# Patient Record
Sex: FEMALE | Race: WHITE | NOT HISPANIC OR LATINO | Employment: FULL TIME | URBAN - METROPOLITAN AREA
[De-identification: names, ages, dates, MRNs, and addresses within clinical notes are randomized per-mention and may not be internally consistent; named-entity substitution may affect disease eponyms.]

---

## 2023-07-05 ENCOUNTER — TELEPHONE (OUTPATIENT)
Dept: PRIMARY CARE | Facility: CLINIC | Age: 63
End: 2023-07-05
Payer: COMMERCIAL

## 2023-07-05 DIAGNOSIS — D24.1 FIBROADENOMA OF BREAST, RIGHT: Primary | ICD-10-CM

## 2023-07-05 DIAGNOSIS — Z12.31 VISIT FOR SCREENING MAMMOGRAM: ICD-10-CM

## 2023-07-05 NOTE — TELEPHONE ENCOUNTER
Result Communication    Resulted Orders   US limited breast    Narrative    Interpreted By:  RONDA HOLMAN MD  MRN: [id number]  Patient Name: JUDITH BAKER     STUDY:  BREAST ULTRASOUND;  7/3/2023 2:08 pm     ACCESSION NUMBER(S):  [id number]     ORDERING CLINICIAN:  NIRALI ARNOLD     INDICATION:  Follow-up of right breast mass. History of right breast benign biopsy.     COMPARISON:  01/03/2023     FINDINGS:  Targeted ultrasound examination with elastography of the right breast  6:00 position 2 cm from nipple again demonstrates the previously  noted circumscribed oval parallel hypoechoic mass, measuring 1.0 x  1.0 x 0.5 cm, without significant change given difference in  technique. It demonstrates no internal vascularity. It is very soft  on the elastography. This again likely represents a fibroadenoma.       Impression    Right breast stable probably benign mass likely representing a  fibroadenoma at 6:00 position. Further ultrasound follow-up in 5-6  months is recommended. This may coincide with the patient's next  annual mammogram.     BI-RADS CATEGORY:     Category: 3 - Probably Benign.  Recommendation: 5-6 month Follow-up.     For any future breast imaging appointments, please call [phone]).          11:41 AM  ----- Message from Nirali Arnold MD sent at 7/5/2023  8:04 AM EDT -----  Right breast ultrasound is unchanged, consistent with a fibroadenoma which is benign, recommendation is repeat regular screening mammogram with ultrasound in 6 months          Results were successfully communicated with the patient and they acknowledged their understanding.

## 2023-07-05 NOTE — TELEPHONE ENCOUNTER
----- Message from Frida Correa MD sent at 7/5/2023  8:04 AM EDT -----  Right breast ultrasound is unchanged, consistent with a fibroadenoma which is benign, recommendation is repeat regular screening mammogram with ultrasound in 6 months

## 2023-07-07 ENCOUNTER — TELEPHONE (OUTPATIENT)
Dept: PRIMARY CARE | Facility: CLINIC | Age: 63
End: 2023-07-07
Payer: COMMERCIAL

## 2023-10-12 PROCEDURE — 87102 FUNGUS ISOLATION CULTURE: CPT

## 2023-10-12 PROCEDURE — 87206 SMEAR FLUORESCENT/ACID STAI: CPT

## 2023-10-12 PROCEDURE — 87102 FUNGUS ISOLATION CULTURE: CPT | Mod: OUT | Performed by: DERMATOLOGY

## 2023-10-20 ENCOUNTER — LAB REQUISITION (OUTPATIENT)
Dept: LAB | Facility: HOSPITAL | Age: 63
End: 2023-10-20
Payer: COMMERCIAL

## 2023-10-20 DIAGNOSIS — B35.3 TINEA PEDIS: ICD-10-CM

## 2023-11-20 LAB
CALCOFLUOR WHITE SPEC: NORMAL
FUNGUS SPEC CULT: NORMAL

## 2023-11-28 PROBLEM — K82.4 POLYP OF GALLBLADDER: Status: ACTIVE | Noted: 2023-11-28

## 2023-11-28 PROBLEM — N95.2 VAGINAL ATROPHY: Status: ACTIVE | Noted: 2023-11-28

## 2023-11-28 PROBLEM — E78.5 DYSLIPIDEMIA (HIGH LDL; LOW HDL): Status: ACTIVE | Noted: 2023-11-28

## 2023-11-28 PROBLEM — N83.209 BENIGN OVARIAN CYST: Status: ACTIVE | Noted: 2023-11-28

## 2023-11-28 PROBLEM — R30.0 DYSURIA: Status: RESOLVED | Noted: 2023-11-28 | Resolved: 2023-11-28

## 2023-11-28 PROBLEM — H81.10 BPPV (BENIGN PAROXYSMAL POSITIONAL VERTIGO): Status: ACTIVE | Noted: 2023-11-28

## 2023-11-28 PROBLEM — M65.332 TRIGGER MIDDLE FINGER OF LEFT HAND: Status: ACTIVE | Noted: 2023-11-28

## 2023-11-28 PROBLEM — R42 VERTIGO: Status: ACTIVE | Noted: 2023-11-28

## 2023-11-28 PROBLEM — E53.8 VITAMIN B12 DEFICIENCY: Status: ACTIVE | Noted: 2023-11-28

## 2023-11-28 PROBLEM — M79.10 MYALGIA: Status: ACTIVE | Noted: 2023-11-28

## 2023-11-28 PROBLEM — L25.9 DERMATITIS, CONTACT: Status: RESOLVED | Noted: 2023-11-28 | Resolved: 2023-11-28

## 2023-11-28 PROBLEM — M25.50 ARTHRALGIA OF MULTIPLE JOINTS: Status: RESOLVED | Noted: 2023-11-28 | Resolved: 2023-11-28

## 2023-11-28 PROBLEM — R10.13 ABDOMINAL PAIN, EPIGASTRIC: Status: RESOLVED | Noted: 2023-11-28 | Resolved: 2023-11-28

## 2023-11-28 PROBLEM — R92.8 ABNORMAL MAMMOGRAM: Status: ACTIVE | Noted: 2023-11-28

## 2023-11-28 PROBLEM — L82.1 SEBORRHEIC KERATOSES: Status: ACTIVE | Noted: 2023-11-28

## 2023-11-28 PROBLEM — M19.049 HAND ARTHRITIS: Status: ACTIVE | Noted: 2023-11-28

## 2023-11-28 PROBLEM — D13.5 ADENOMYOMATOSIS OF GALLBLADDER: Status: ACTIVE | Noted: 2023-11-28

## 2023-11-28 PROBLEM — F43.9 STRESS AT HOME: Status: ACTIVE | Noted: 2023-11-28

## 2023-11-28 PROBLEM — K80.20 CHOLELITHIASIS: Status: ACTIVE | Noted: 2023-11-28

## 2023-11-28 PROBLEM — M25.572 ANKLE PAIN, LEFT: Status: RESOLVED | Noted: 2023-11-28 | Resolved: 2023-11-28

## 2023-11-28 PROBLEM — F41.1 GENERALIZED ANXIETY DISORDER: Status: ACTIVE | Noted: 2023-11-28

## 2023-11-28 RX ORDER — IBUPROFEN 600 MG/1
TABLET ORAL
COMMUNITY
Start: 2023-01-05 | End: 2023-11-30 | Stop reason: ALTCHOICE

## 2023-11-28 RX ORDER — DOCOSANOL 100 MG/G
CREAM TOPICAL
COMMUNITY
Start: 2017-07-12 | End: 2023-11-30 | Stop reason: ALTCHOICE

## 2023-11-28 RX ORDER — NAPROXEN 500 MG/1
TABLET ORAL
COMMUNITY
Start: 2023-01-20 | End: 2023-11-30 | Stop reason: ALTCHOICE

## 2023-11-28 RX ORDER — OXYCODONE HYDROCHLORIDE 5 MG/1
TABLET ORAL
COMMUNITY
Start: 2023-01-05 | End: 2023-11-30 | Stop reason: ALTCHOICE

## 2023-11-28 RX ORDER — ACETAMINOPHEN 500 MG
TABLET ORAL
COMMUNITY
End: 2023-11-30 | Stop reason: ALTCHOICE

## 2023-11-28 RX ORDER — CONJUGATED ESTROGENS 0.62 MG/G
CREAM VAGINAL
COMMUNITY
End: 2023-11-30 | Stop reason: ALTCHOICE

## 2023-11-28 RX ORDER — ACETAMINOPHEN 325 MG/1
TABLET ORAL
COMMUNITY
Start: 2023-01-05 | End: 2023-11-30 | Stop reason: ALTCHOICE

## 2023-11-28 RX ORDER — DOCUSATE SODIUM 100 MG/1
CAPSULE, LIQUID FILLED ORAL
COMMUNITY
Start: 2023-01-05 | End: 2023-11-30 | Stop reason: ALTCHOICE

## 2023-11-28 RX ORDER — TRIAMCINOLONE ACETONIDE 1 MG/ML
LOTION TOPICAL
COMMUNITY
Start: 2023-02-03

## 2023-11-30 ENCOUNTER — OFFICE VISIT (OUTPATIENT)
Dept: PRIMARY CARE | Facility: CLINIC | Age: 63
End: 2023-11-30
Payer: COMMERCIAL

## 2023-11-30 VITALS
OXYGEN SATURATION: 98 % | BODY MASS INDEX: 22.21 KG/M2 | SYSTOLIC BLOOD PRESSURE: 146 MMHG | DIASTOLIC BLOOD PRESSURE: 74 MMHG | HEIGHT: 63 IN | WEIGHT: 125.38 LBS | TEMPERATURE: 98.2 F | HEART RATE: 62 BPM

## 2023-11-30 DIAGNOSIS — M79.10 MYALGIA: ICD-10-CM

## 2023-11-30 DIAGNOSIS — Z13.220 LIPID SCREENING: ICD-10-CM

## 2023-11-30 DIAGNOSIS — H81.10 BENIGN PAROXYSMAL POSITIONAL VERTIGO, UNSPECIFIED LATERALITY: ICD-10-CM

## 2023-11-30 DIAGNOSIS — Z00.00 ROUTINE GENERAL MEDICAL EXAMINATION AT A HEALTH CARE FACILITY: Primary | ICD-10-CM

## 2023-11-30 DIAGNOSIS — R10.13 EPIGASTRIC ABDOMINAL PAIN: ICD-10-CM

## 2023-11-30 DIAGNOSIS — E53.8 VITAMIN B12 DEFICIENCY: ICD-10-CM

## 2023-11-30 DIAGNOSIS — R03.0 ELEVATED BLOOD PRESSURE READING: ICD-10-CM

## 2023-11-30 DIAGNOSIS — E78.5 DYSLIPIDEMIA (HIGH LDL; LOW HDL): ICD-10-CM

## 2023-11-30 LAB
POC APPEARANCE, URINE: CLEAR
POC BILIRUBIN, URINE: NEGATIVE
POC BLOOD, URINE: NEGATIVE
POC COLOR, URINE: YELLOW
POC GLUCOSE, URINE: NEGATIVE MG/DL
POC KETONES, URINE: NEGATIVE MG/DL
POC LEUKOCYTES, URINE: NEGATIVE
POC NITRITE,URINE: NEGATIVE
POC PH, URINE: 7 PH
POC PROTEIN, URINE: NEGATIVE MG/DL
POC SPECIFIC GRAVITY, URINE: 1.01
POC UROBILINOGEN, URINE: 0.2 EU/DL

## 2023-11-30 PROCEDURE — 81003 URINALYSIS AUTO W/O SCOPE: CPT | Performed by: FAMILY MEDICINE

## 2023-11-30 PROCEDURE — 90686 IIV4 VACC NO PRSV 0.5 ML IM: CPT | Performed by: FAMILY MEDICINE

## 2023-11-30 PROCEDURE — 99213 OFFICE O/P EST LOW 20 MIN: CPT | Performed by: FAMILY MEDICINE

## 2023-11-30 PROCEDURE — 90471 IMMUNIZATION ADMIN: CPT | Performed by: FAMILY MEDICINE

## 2023-11-30 PROCEDURE — 99396 PREV VISIT EST AGE 40-64: CPT | Performed by: FAMILY MEDICINE

## 2023-11-30 PROCEDURE — 1036F TOBACCO NON-USER: CPT | Performed by: FAMILY MEDICINE

## 2023-11-30 ASSESSMENT — ENCOUNTER SYMPTOMS
NAUSEA: 0
CONSTIPATION: 0
DIARRHEA: 0
VOMITING: 0

## 2023-11-30 NOTE — PROGRESS NOTES
Subjective   Patient ID: Geri Hwang is a 63 y.o. female who presents for Annual Exam. States she still has right upper quadrant abdominal pains; h/o gallbladder removal.        Right upper quadrant pain, epigastric right and left has had over last couple years   No positional trigger,   Not terrible ,  more daily lately  Not association with eating   Lasts a few seconds   No meds tried     had gallbladder taken out in January    No upper scope or GI eval for this   No elimination diet tried       Mood is ok    Some work stress     No hx of HTN     Fam hx :  HTN ,  obesity     Some vertigo with moving head     Exercising routinely ,  tennis 3 days per week     Some leg cramps at times lower legs into upper legs      ROS :  ( No or Yes )  Any eye problems:    N  Frequent nasal congestion or sneezing:  N  Difficulty hearing:  N  Ear problems:   N  Asthma or wheezing:   N  Frequent cough:   N  Shortness of breath:N  Hemoptysis: N  Hx of TB: N  High blood pressure: N  Heart disease: N  Heart murmur:N  Chest pain or pressure with exertion:N  Leg pains with walking up hill: N  Fast heartbeat or palpitations:y  Varicose veins: N  Difficulty swallowing foods or liquids: N  Abdominal pains: y  Frequent indigestion or heartburn: N  Constipation: N  Diarrhea or loose stools: N  Weight changes recently: N  Change in bowel movements: N  An ulcer: N  Black stools: N  Jaundice, hepatitis or liver problems: N  Gallstones or gallbladder problems: N  Stomach or intestinal problems: N  Vomited blood : N  Blood in bowel movements: N  Sickle cell trait  or Anemia: N  Been refused as a blood donor: N  Problems with her kidney, bladder, or prostate: N  Loss of control of your urine: N  Pain or burning with urination: N  Blood in her urine: N  Trouble starting flow of urine: N  Frequent urination at night: y  History of venereal disease: N  Any skin problems: N  Diabetes: N  Thyroid disease: N  Frequent back pain: N  Pain or swelling  "around joints: N  Broken any bones: N  Frequent headaches: N  Dizziness: N  Have you ever had Seizures or convulsions: N  Have you ever temporarily lost control of your hand or foot : N   Had a stroke or been paralyzed : N  Temporarily lost your ability to speak: N  Fainted or lost consciousness: N  Hallucinations: N  Nervousness: N  Do you take medications for your nerves: N  Trouble falling asleep or staying asleep: N  Do you feel tired even after a good night sleep: N  Do you feel down in the dumps or depressed: N  Frequent crying: N  Using alcohol excessively: N  Any street drug use : N  Do have any other medical problems that are concerns :     Review of Systems   Gastrointestinal:  Negative for constipation, diarrhea, nausea and vomiting.       Objective   /74   Pulse 62   Temp 36.8 °C (98.2 °F)   Ht 1.588 m (5' 2.5\")   Wt 56.9 kg (125 lb 6 oz)   SpO2 98%   BMI 22.57 kg/m²     Physical Exam  Constitutional:       General: She is not in acute distress.     Appearance: Normal appearance.   HENT:      Head: Normocephalic and atraumatic.      Right Ear: Tympanic membrane, ear canal and external ear normal.      Left Ear: Tympanic membrane, ear canal and external ear normal.      Nose: Nose normal. No congestion or rhinorrhea.      Mouth/Throat:      Mouth: Mucous membranes are moist.      Pharynx: No oropharyngeal exudate or posterior oropharyngeal erythema.   Eyes:      Extraocular Movements: Extraocular movements intact.      Conjunctiva/sclera: Conjunctivae normal.      Pupils: Pupils are equal, round, and reactive to light.   Neck:      Vascular: No carotid bruit.   Cardiovascular:      Rate and Rhythm: Normal rate and regular rhythm.      Heart sounds: No murmur heard.  Pulmonary:      Effort: Pulmonary effort is normal.      Breath sounds: Normal breath sounds. No wheezing or rhonchi.   Abdominal:      General: Bowel sounds are normal. There is no distension.      Palpations: Abdomen is soft.     "  Tenderness: There is no abdominal tenderness. There is no guarding.   Musculoskeletal:         General: No swelling.      Cervical back: No rigidity.      Right lower leg: No edema.      Left lower leg: No edema.   Lymphadenopathy:      Cervical: No cervical adenopathy.   Skin:     General: Skin is warm and dry.      Findings: No rash.   Neurological:      General: No focal deficit present.      Mental Status: She is alert and oriented to person, place, and time.      Cranial Nerves: No cranial nerve deficit.      Gait: Gait normal.      Deep Tendon Reflexes: Reflexes normal.   Psychiatric:         Mood and Affect: Mood normal.         Behavior: Behavior normal.         Assessment/Plan   Problem List Items Addressed This Visit             ICD-10-CM    BPPV (benign paroxysmal positional vertigo) H81.10    Relevant Orders    CBC    Comprehensive Metabolic Panel    Thyroid Stimulating Hormone    Lipid Panel    Vitamin B12    Vitamin D 25-Hydroxy,Total (for eval of Vitamin D levels)    CK    Magnesium    Dyslipidemia (high LDL; low HDL) E78.5    Relevant Orders    CBC    Comprehensive Metabolic Panel    Thyroid Stimulating Hormone    Lipid Panel    Vitamin B12    Vitamin D 25-Hydroxy,Total (for eval of Vitamin D levels)    CK    Magnesium    Myalgia M79.10    Relevant Orders    CBC    Comprehensive Metabolic Panel    Thyroid Stimulating Hormone    Lipid Panel    Vitamin B12    Vitamin D 25-Hydroxy,Total (for eval of Vitamin D levels)    CK    Magnesium    Vitamin B12 deficiency E53.8    Relevant Orders    CBC    Comprehensive Metabolic Panel    Thyroid Stimulating Hormone    Lipid Panel    Vitamin B12    Vitamin D 25-Hydroxy,Total (for eval of Vitamin D levels)    CK    Magnesium     Other Visit Diagnoses         Codes    Routine general medical examination at a health care facility    -  Primary Z00.00    Relevant Orders    CBC    Comprehensive Metabolic Panel    Thyroid Stimulating Hormone    Lipid Panel    Vitamin  B12    Vitamin D 25-Hydroxy,Total (for eval of Vitamin D levels)    CK    Magnesium    Lipid screening     Z13.220    Relevant Orders    CBC    Comprehensive Metabolic Panel    Thyroid Stimulating Hormone    Lipid Panel    Vitamin B12    Vitamin D 25-Hydroxy,Total (for eval of Vitamin D levels)    CK    Magnesium    Epigastric abdominal pain     R10.13    Relevant Orders    CBC    Comprehensive Metabolic Panel    Thyroid Stimulating Hormone    Lipid Panel    Vitamin B12    Vitamin D 25-Hydroxy,Total (for eval of Vitamin D levels)    CK    Magnesium    Elevated blood pressure reading     R03.0    Relevant Orders    CBC    Comprehensive Metabolic Panel    Thyroid Stimulating Hormone    Lipid Panel    Vitamin B12    Vitamin D 25-Hydroxy,Total (for eval of Vitamin D levels)    CK    Magnesium    POCT UA Automated manually resulted

## 2023-11-30 NOTE — PATIENT INSTRUCTIONS
Get your blood work as ordered.  You should hear from our office with results whether they are normal are not within a few days.  Please call the office if you do not hear from us.       You should be getting cardiovascular exercise 3-5 times per week for 30-45 minutes.  This includes exercises such as running, brisk walking, biking or swimming.     Mammogram and ultrasound as ordered      Recommend follow up with GYN ,  pelvic ultrasound repeat       It is important that you monitor your blood pressure.  There are multiple different brands for blood pressure monitors that are good, [employer] and [employer] I[employer] are good brands.  You can find these at drug stores or online, if you have questions you can always ask your pharmacist.  The upper arm cuff is preferred.  Generally it is recommended to sit for 5 minutes, put your arm at heart height, do not talk and pressed the button.  you should check your blood pressures 2-3 times per month.  Your goal should be systolic (upper number ) < 140, and diastolic (bottom number) < 90.  Please periodically inform office of your BP numbers.  You should follow a low salt diet and exercise routinely.      It is recommended that you check your blood pressure at least twice per month, this can be done on a home machine or at the [employer] grocery store.  your blood pressure should be less than 140/90  You should follow a low-salt diet, get regular exercise, keep your weight under control.      With tennis increase electrolytes       There are several things that can cause GI symptoms including loose stools, gas.  That require elimination diets to determine the culprit.      To  determine if you're lactose intolerance is to do a lactose free diet for 2 weeks.  Avoid all dairy products including milk, yogurt, ice cream, butter, etc. for 2 weeks.  If your symptoms improve then most likely you have some lactose intolerance.  This can be treated with over-the-counter Lactaid or lactase milk.   Sometimes this is sufficient to relieve symptoms.  However, some people still have symptoms even with a Lactaid and require a complete lactose free diet.    If you continue to have symptoms a trial of a gluten-free diet is indicated: Gluten is found in wheat, barley, and rye.  These ingredients are found in many foods.  Eating gluten-free can be done by looking in the gluten-free section of the grocery store, avoiding these grains for 1 to 2 weeks and see if it helps her symptoms.  Even people without celiac disease can have some gluten intolerance and noticed GI and symptoms improve off of gluten.    The last culprit that is fairly common is fructose intolerance, this is a little more difficult to avoid as it is quite common in our foods: This is seen in high fructose corn syrup which is a common ingredient of foods, high fructose natural sources include things such as apples, grapes, watermelon, asparagus.  I recommend looking fructose up on the Internet to determine foods to avoid.

## 2023-12-07 ENCOUNTER — LAB (OUTPATIENT)
Dept: LAB | Facility: LAB | Age: 63
End: 2023-12-07
Payer: COMMERCIAL

## 2023-12-07 DIAGNOSIS — R03.0 ELEVATED BLOOD PRESSURE READING: ICD-10-CM

## 2023-12-07 DIAGNOSIS — Z00.00 ROUTINE GENERAL MEDICAL EXAMINATION AT A HEALTH CARE FACILITY: ICD-10-CM

## 2023-12-07 DIAGNOSIS — E53.8 VITAMIN B12 DEFICIENCY: ICD-10-CM

## 2023-12-07 DIAGNOSIS — R10.13 EPIGASTRIC ABDOMINAL PAIN: ICD-10-CM

## 2023-12-07 DIAGNOSIS — Z13.220 LIPID SCREENING: ICD-10-CM

## 2023-12-07 DIAGNOSIS — E78.5 DYSLIPIDEMIA (HIGH LDL; LOW HDL): ICD-10-CM

## 2023-12-07 DIAGNOSIS — M79.10 MYALGIA: ICD-10-CM

## 2023-12-07 DIAGNOSIS — H81.10 BENIGN PAROXYSMAL POSITIONAL VERTIGO, UNSPECIFIED LATERALITY: ICD-10-CM

## 2023-12-07 LAB
ALBUMIN SERPL BCP-MCNC: 4.3 G/DL (ref 3.4–5)
ALP SERPL-CCNC: 50 U/L (ref 33–136)
ALT SERPL W P-5'-P-CCNC: 18 U/L (ref 7–45)
ANION GAP SERPL CALC-SCNC: 16 MMOL/L (ref 10–20)
AST SERPL W P-5'-P-CCNC: 22 U/L (ref 9–39)
BILIRUB SERPL-MCNC: 1.3 MG/DL (ref 0–1.2)
BUN SERPL-MCNC: 12 MG/DL (ref 6–23)
CALCIUM SERPL-MCNC: 9.6 MG/DL (ref 8.6–10.3)
CHLORIDE SERPL-SCNC: 102 MMOL/L (ref 98–107)
CHOLEST SERPL-MCNC: 212 MG/DL (ref 0–199)
CHOLESTEROL/HDL RATIO: 2.2
CK SERPL-CCNC: 143 U/L (ref 0–215)
CO2 SERPL-SCNC: 29 MMOL/L (ref 21–32)
CREAT SERPL-MCNC: 0.77 MG/DL (ref 0.5–1.05)
ERYTHROCYTE [DISTWIDTH] IN BLOOD BY AUTOMATED COUNT: 12.6 % (ref 11.5–14.5)
GFR SERPL CREATININE-BSD FRML MDRD: 88 ML/MIN/1.73M*2
GLUCOSE SERPL-MCNC: 89 MG/DL (ref 74–99)
HCT VFR BLD AUTO: 42.4 % (ref 36–46)
HDLC SERPL-MCNC: 98 MG/DL
HGB BLD-MCNC: 13.8 G/DL (ref 12–16)
LDLC SERPL CALC-MCNC: 104 MG/DL
MAGNESIUM SERPL-MCNC: 2.38 MG/DL (ref 1.6–2.4)
MCH RBC QN AUTO: 30.8 PG (ref 26–34)
MCHC RBC AUTO-ENTMCNC: 32.5 G/DL (ref 32–36)
MCV RBC AUTO: 95 FL (ref 80–100)
NON HDL CHOLESTEROL: 114 MG/DL (ref 0–149)
NRBC BLD-RTO: 0 /100 WBCS (ref 0–0)
PLATELET # BLD AUTO: 253 X10*3/UL (ref 150–450)
POTASSIUM SERPL-SCNC: 4.1 MMOL/L (ref 3.5–5.3)
PROT SERPL-MCNC: 6.3 G/DL (ref 6.4–8.2)
RBC # BLD AUTO: 4.48 X10*6/UL (ref 4–5.2)
SODIUM SERPL-SCNC: 143 MMOL/L (ref 136–145)
TRIGL SERPL-MCNC: 51 MG/DL (ref 0–149)
TSH SERPL-ACNC: 1.8 MIU/L (ref 0.44–3.98)
VLDL: 10 MG/DL (ref 0–40)
WBC # BLD AUTO: 5.9 X10*3/UL (ref 4.4–11.3)

## 2023-12-07 PROCEDURE — 82607 VITAMIN B-12: CPT

## 2023-12-07 PROCEDURE — 84443 ASSAY THYROID STIM HORMONE: CPT

## 2023-12-07 PROCEDURE — 80053 COMPREHEN METABOLIC PANEL: CPT

## 2023-12-07 PROCEDURE — 36415 COLL VENOUS BLD VENIPUNCTURE: CPT

## 2023-12-07 PROCEDURE — 80061 LIPID PANEL: CPT

## 2023-12-07 PROCEDURE — 83735 ASSAY OF MAGNESIUM: CPT

## 2023-12-07 PROCEDURE — 85027 COMPLETE CBC AUTOMATED: CPT

## 2023-12-07 PROCEDURE — 82306 VITAMIN D 25 HYDROXY: CPT

## 2023-12-07 PROCEDURE — 82550 ASSAY OF CK (CPK): CPT

## 2023-12-08 ENCOUNTER — TELEPHONE (OUTPATIENT)
Dept: PRIMARY CARE | Facility: CLINIC | Age: 63
End: 2023-12-08
Payer: COMMERCIAL

## 2023-12-08 LAB
25(OH)D3 SERPL-MCNC: 42 NG/ML (ref 30–100)
VIT B12 SERPL-MCNC: 340 PG/ML (ref 211–911)

## 2023-12-08 NOTE — TELEPHONE ENCOUNTER
Result Communication    Resulted Orders   CBC   Result Value Ref Range    WBC 5.9 4.4 - 11.3 x10*3/uL    nRBC 0.0 0.0 - 0.0 /100 WBCs    RBC 4.48 4.00 - 5.20 x10*6/uL    Hemoglobin 13.8 12.0 - 16.0 g/dL    Hematocrit 42.4 36.0 - 46.0 %    MCV 95 80 - 100 fL    MCH 30.8 26.0 - 34.0 pg    MCHC 32.5 32.0 - 36.0 g/dL    RDW 12.6 11.5 - 14.5 %    Platelets 253 150 - 450 x10*3/uL   Comprehensive Metabolic Panel   Result Value Ref Range    Glucose 89 74 - 99 mg/dL    Sodium 143 136 - 145 mmol/L    Potassium 4.1 3.5 - 5.3 mmol/L    Chloride 102 98 - 107 mmol/L    Bicarbonate 29 21 - 32 mmol/L    Anion Gap 16 10 - 20 mmol/L    Urea Nitrogen 12 6 - 23 mg/dL    Creatinine 0.77 0.50 - 1.05 mg/dL    eGFR 88 >60 mL/min/1.73m*2      Comment:      Calculations of estimated GFR are performed using the 2021 CKD-EPI Study Refit equation without the race variable for the IDMS-Traceable creatinine methods.  https://jasn.asnjournals.org/content/early/2021/09/22/ASN.7805436429    Calcium 9.6 8.6 - 10.3 mg/dL    Albumin 4.3 3.4 - 5.0 g/dL    Alkaline Phosphatase 50 33 - 136 U/L    Total Protein 6.3 (L) 6.4 - 8.2 g/dL    AST 22 9 - 39 U/L    Bilirubin, Total 1.3 (H) 0.0 - 1.2 mg/dL    ALT 18 7 - 45 U/L      Comment:      Patients treated with Sulfasalazine may generate falsely decreased results for ALT.   Thyroid Stimulating Hormone   Result Value Ref Range    Thyroid Stimulating Hormone 1.80 0.44 - 3.98 mIU/L    Narrative    TSH testing is performed using different testing methodology at [facility] than at other [facility] hospitals. Direct result comparisons should only be made within the same method.     Lipid Panel   Result Value Ref Range    Cholesterol 212 (H) 0 - 199 mg/dL      Comment:            Age      Desirable   Borderline High   High     0-19 Y     0 - 169       170 - 199     >/= 200    20-24 Y     0 - 189       190 - 224     >/= 225         >24 Y     0 - 199       200 - 239     >/= 240   **All ranges are based on  fasting samples. Specific   therapeutic targets will vary based on patient-specific   cardiac risk.    Pediatric guidelines reference:Pediatrics 2011, 128(S5).Adult guidelines reference: NCEP ATPIII Guidelines,RAND 2001, 258:2486-97    Venipuncture immediately after or during the administration of Metamizole may lead to falsely low results. Testing should be performed immediately prior to Metamizole dosing.    HDL-Cholesterol 98.0 mg/dL      Comment:        Age       Very Low   Low     Normal    High    0-19 Y    < 35      < 40     40-45     ----  20-24 Y    ----     < 40      >45      ----        >24 Y      ----     < 40     40-60      >60      Cholesterol/HDL Ratio 2.2       Comment:        Ref Values  Desirable  < 3.4  High Risk  > 5.0    LDL Calculated 104 (H) <=99 mg/dL      Comment:                                  Near   Borderline      AGE      Desirable  Optimal    High     High     Very High     0-19 Y     0 - 109     ---    110-129   >/= 130     ----    20-24 Y     0 - 119     ---    120-159   >/= 160     ----      >24 Y     0 -  99   100-129  130-159   160-189     >/=190      VLDL 10 0 - 40 mg/dL    Triglycerides 51 0 - 149 mg/dL      Comment:         Age         Desirable   Borderline High   High     Very High   0 D-90 D    19 - 174         ----         ----        ----  91 D- 9 Y     0 -  74        75 -  99     >/= 100      ----    10-19 Y     0 -  89        90 - 129     >/= 130      ----    20-24 Y     0 - 114       115 - 149     >/= 150      ----         >24 Y     0 - 149       150 - 199    200- 499    >/= 500    Venipuncture immediately after or during the administration of Metamizole may lead to falsely low results. Testing should be performed immediately prior to Metamizole dosing.    Non HDL Cholesterol 114 0 - 149 mg/dL      Comment:            Age       Desirable   Borderline High   High     Very High     0-19 Y     0 - 119       120 - 144     >/= 145    >/= 160    20-24 Y     0 - 149       150  - 189     >/= 190      ----         >24 Y    30 mg/dL above LDL Cholesterol goal     Vitamin B12   Result Value Ref Range    Vitamin B12 340 211 - 911 pg/mL   Vitamin D 25-Hydroxy,Total (for eval of Vitamin D levels)   Result Value Ref Range    Vitamin D, 25-Hydroxy, Total 42 30 - 100 ng/mL    Narrative    Deficiency:         < 20   ng/ml  Insufficiency:      20-29  ng/ml  Sufficiency:         ng/ml  This assay accurately quantifies the sum of Vitamin D3, 25-Hydroxy and Vitamin D2,25-Hydroxy.   CK   Result Value Ref Range    Creatine Kinase 143 0 - 215 U/L   Magnesium   Result Value Ref Range    Magnesium 2.38 1.60 - 2.40 mg/dL       12:16 PM      Results were successfully communicated with the patient and they acknowledged their understanding.

## 2023-12-08 NOTE — TELEPHONE ENCOUNTER
----- Message from Frida Correa MD sent at 12/8/2023 10:15 AM EST -----  Slightly low protein, recommend increasing protein in diet, cholesterol is okay because HDL is high, rest of labs normal

## 2023-12-21 ENCOUNTER — ANCILLARY PROCEDURE (OUTPATIENT)
Dept: RADIOLOGY | Facility: CLINIC | Age: 63
End: 2023-12-21
Payer: COMMERCIAL

## 2023-12-21 ENCOUNTER — PROCEDURE VISIT (OUTPATIENT)
Dept: OBSTETRICS AND GYNECOLOGY | Facility: CLINIC | Age: 63
End: 2023-12-21
Payer: COMMERCIAL

## 2023-12-21 VITALS
BODY MASS INDEX: 21.62 KG/M2 | WEIGHT: 122 LBS | SYSTOLIC BLOOD PRESSURE: 152 MMHG | DIASTOLIC BLOOD PRESSURE: 83 MMHG | HEIGHT: 63 IN

## 2023-12-21 DIAGNOSIS — R92.8 OTHER ABNORMAL AND INCONCLUSIVE FINDINGS ON DIAGNOSTIC IMAGING OF BREAST: ICD-10-CM

## 2023-12-21 DIAGNOSIS — R19.00 PELVIC MASS IN FEMALE: Primary | ICD-10-CM

## 2023-12-21 PROCEDURE — 77062 BREAST TOMOSYNTHESIS BI: CPT | Performed by: RADIOLOGY

## 2023-12-21 PROCEDURE — 76642 ULTRASOUND BREAST LIMITED: CPT | Mod: RT

## 2023-12-21 PROCEDURE — 99213 OFFICE O/P EST LOW 20 MIN: CPT | Performed by: OBSTETRICS & GYNECOLOGY

## 2023-12-21 PROCEDURE — 77066 DX MAMMO INCL CAD BI: CPT | Performed by: RADIOLOGY

## 2023-12-21 PROCEDURE — 77066 DX MAMMO INCL CAD BI: CPT

## 2023-12-21 PROCEDURE — 76642 ULTRASOUND BREAST LIMITED: CPT | Performed by: RADIOLOGY

## 2023-12-21 ASSESSMENT — PAIN SCALES - GENERAL: PAINLEVEL: 0-NO PAIN

## 2023-12-21 NOTE — PROGRESS NOTES
Subjective   Patient ID: Geri Hwang is a 63 y.o. female who presents for Follow-up (Pt is here for a follow up ultrasound/Declines chaperone.   Maria Isabel Smith LPN).  HPI patient is here for follow-up of left adnexal cystic mass        Objective   Physical Exam  CVA negative abdomen soft without masses or tenderness external genitalia vagina atrophic cervix without motion tenderness uterus not palpable absent no palpable adnexal masses  Vaginal probe pelvic ultrasound shows left adnexal cystic mass appearance of associated suggestive of hydrosalpinx measuring 1.9 x 1.5 x 1.97 cm total volume 2.8 mL it appears to be benign adnexal mass    Assessment/Plan    stable left adnexal mass         Daniel Antonio MD 12/21/23 9:05 AM

## 2024-02-01 ENCOUNTER — APPOINTMENT (OUTPATIENT)
Dept: GASTROENTEROLOGY | Facility: CLINIC | Age: 64
End: 2024-02-01
Payer: COMMERCIAL

## 2024-02-08 ENCOUNTER — LAB (OUTPATIENT)
Dept: LAB | Facility: LAB | Age: 64
End: 2024-02-08
Payer: COMMERCIAL

## 2024-02-08 ENCOUNTER — OFFICE VISIT (OUTPATIENT)
Dept: GASTROENTEROLOGY | Facility: CLINIC | Age: 64
End: 2024-02-08
Payer: COMMERCIAL

## 2024-02-08 VITALS — BODY MASS INDEX: 22.36 KG/M2 | OXYGEN SATURATION: 96 % | HEIGHT: 63 IN | HEART RATE: 61 BPM | WEIGHT: 126.2 LBS

## 2024-02-08 DIAGNOSIS — R10.13 EPIGASTRIC PAIN: ICD-10-CM

## 2024-02-08 DIAGNOSIS — R10.13 EPIGASTRIC PAIN: Primary | ICD-10-CM

## 2024-02-08 LAB
ALBUMIN SERPL BCP-MCNC: 4.6 G/DL (ref 3.4–5)
ALP SERPL-CCNC: 51 U/L (ref 33–136)
ALT SERPL W P-5'-P-CCNC: 18 U/L (ref 7–45)
ANION GAP SERPL CALC-SCNC: 12 MMOL/L (ref 10–20)
AST SERPL W P-5'-P-CCNC: 23 U/L (ref 9–39)
BILIRUB SERPL-MCNC: 1.1 MG/DL (ref 0–1.2)
BUN SERPL-MCNC: 18 MG/DL (ref 6–23)
CALCIUM SERPL-MCNC: 10.2 MG/DL (ref 8.6–10.6)
CHLORIDE SERPL-SCNC: 104 MMOL/L (ref 98–107)
CO2 SERPL-SCNC: 29 MMOL/L (ref 21–32)
CREAT SERPL-MCNC: 0.77 MG/DL (ref 0.5–1.05)
EGFRCR SERPLBLD CKD-EPI 2021: 88 ML/MIN/1.73M*2
ERYTHROCYTE [DISTWIDTH] IN BLOOD BY AUTOMATED COUNT: 12.5 % (ref 11.5–14.5)
GLUCOSE SERPL-MCNC: 88 MG/DL (ref 74–99)
HCT VFR BLD AUTO: 43.8 % (ref 36–46)
HGB BLD-MCNC: 14.5 G/DL (ref 12–16)
LIPASE SERPL-CCNC: 41 U/L (ref 9–82)
MCH RBC QN AUTO: 30.4 PG (ref 26–34)
MCHC RBC AUTO-ENTMCNC: 33.1 G/DL (ref 32–36)
MCV RBC AUTO: 92 FL (ref 80–100)
NRBC BLD-RTO: 0 /100 WBCS (ref 0–0)
PLATELET # BLD AUTO: 266 X10*3/UL (ref 150–450)
POTASSIUM SERPL-SCNC: 4.2 MMOL/L (ref 3.5–5.3)
PROT SERPL-MCNC: 6.7 G/DL (ref 6.4–8.2)
RBC # BLD AUTO: 4.77 X10*6/UL (ref 4–5.2)
SODIUM SERPL-SCNC: 141 MMOL/L (ref 136–145)
WBC # BLD AUTO: 8 X10*3/UL (ref 4.4–11.3)

## 2024-02-08 PROCEDURE — 80053 COMPREHEN METABOLIC PANEL: CPT

## 2024-02-08 PROCEDURE — 1036F TOBACCO NON-USER: CPT | Performed by: INTERNAL MEDICINE

## 2024-02-08 PROCEDURE — 85027 COMPLETE CBC AUTOMATED: CPT

## 2024-02-08 PROCEDURE — 83690 ASSAY OF LIPASE: CPT

## 2024-02-08 PROCEDURE — 99214 OFFICE O/P EST MOD 30 MIN: CPT | Performed by: INTERNAL MEDICINE

## 2024-02-08 PROCEDURE — 36415 COLL VENOUS BLD VENIPUNCTURE: CPT

## 2024-02-08 NOTE — PROGRESS NOTES
Subjective     History of Present Illness:   Geri Hwang is a 63 y.o. female with upper abd pain - variable in location - for> 2 years, now more frequent, lasts longer - had lap shannan  1/2023 - pathology was normal -  pain unrelated to meals - pain mostly not present - occurs daily - good appetite, no weight loss - no meds for this pain - pain positional at times -     Review of Systems  Review of Systems    Allergies  No Known Allergies    Medications  Current Outpatient Medications   Medication Instructions    calcium carbonate/vitamin D3 (CALCIUM WITH VITAMIN D3 ORAL) 1 tablet, oral, Daily    triamcinolone (Kenalog) 0.1 % lotion Apply sparingly and massage in gently to affected area(s) 2-3 times daily for up to 2 weeks.        Objective   Visit Vitals  Pulse 61      Physical Exam    130/74    Lungs clear  CV rrr, no mrg  Abd - soft,nontender throughout      Lab Results   Component Value Date    WBC 5.9 12/07/2023    WBC 5.0 11/23/2022    WBC 5.6 07/21/2022    HGB 13.8 12/07/2023    HGB 15.2 11/23/2022    HGB 15.0 07/21/2022    HCT 42.4 12/07/2023    HCT 45.9 11/23/2022    HCT 45.9 07/21/2022     12/07/2023     11/23/2022     07/21/2022     Lab Results   Component Value Date     12/07/2023     11/23/2022     07/21/2022    K 4.1 12/07/2023    K 4.2 11/23/2022    K 4.3 07/21/2022     12/07/2023     11/23/2022     07/21/2022    CO2 29 12/07/2023    CO2 28 11/23/2022    CO2 29 07/21/2022    BUN 12 12/07/2023    BUN 15 11/23/2022    BUN 11 07/21/2022    CREATININE 0.77 12/07/2023    CREATININE 0.81 11/23/2022    CREATININE 0.78 07/21/2022    CALCIUM 9.6 12/07/2023    CALCIUM 10.1 11/23/2022    CALCIUM 9.8 07/21/2022    PROT 6.3 (L) 12/07/2023    PROT 6.9 11/23/2022    PROT 7.0 07/21/2022    BILITOT 1.3 (H) 12/07/2023    BILITOT 1.0 11/23/2022    BILITOT 1.3 (H) 07/21/2022    ALKPHOS 50 12/07/2023    ALKPHOS 51 11/23/2022    ALKPHOS 58 07/21/2022    ALT 18  12/07/2023    ALT 21 11/23/2022    ALT 29 07/21/2022    AST 22 12/07/2023    AST 44 (H) 11/23/2022    AST 32 07/21/2022    GLUCOSE 89 12/07/2023    GLUCOSE 80 11/23/2022    GLUCOSE 98 07/21/2022    CHOL 212 (H) 12/07/2023    CHOL 214 (H) 11/23/2022    CHOL 203 (H) 12/21/2021    LDLF 107 (H) 11/23/2022    LDLF 112 (H) 12/21/2021    LDLF 115 (H) 12/21/2020    CHHDL 2.2 12/07/2023    CHHDL 2.3 11/23/2022    CHHDL 2.5 12/21/2021     BI US breast limited right, BI mammo bilateral diagnostic tomosynthesis  Narrative: Interpreted By:  Jodi Ansari,   STUDY:  BI MAMMO BILATERAL DIAGNOSTIC TOMOSYNTHESIS; BI US BREAST LIMITED  RIGHT;  12/21/2023 11:28 am; 12/21/2023 12:03 pm      ACCESSION NUMBER(S):  [id number]; [id number]      ORDERING CLINICIAN:  JEREMI COTTON      INDICATION:  Signs/Symptoms:annual c 6 mos fu; order needed; patient aware.  Continued short-term follow-up for probably benign right breast mass.  Benign right breast biopsy. Family history of breast cancer.      COMPARISON:  Bilateral mammogram 12/27/2022, 12/18/2021, 11/19/2020, 07/15/2019.  Right breast ultrasound 07/03/2023, 01/03/2023      FINDINGS:  MAMMOGRAPHY: 2D and tomosynthesis images were reviewed at 1 mm slice  thickness.      Density:  The breast tissue is heterogeneously dense, which may  obscure small masses.      There is a stable oval equal density mass in the deep central  inferior right breast, the area being followed with ultrasound. There  is stable mild postsurgical scarring in the upper outer right breast.  No suspicious masses or calcifications are identified in the left  breast.      ULTRASOUND:  Sonographic scanning targeted to the 6 o'clock position  right breast 2 cm from the nipple demonstrates a stable oval  circumscribed parallel hypoechoic mass measuring 1.0 x 0.4 x 0.8 cm.  There is peripheral Doppler blood flow and the mass remains soft in  the elastography. This is unchanged compared with ultrasound  01/03/2023.       Impression: Stable probably benign right breast mass, unchanged sonographically  for 1 year and suggested on previous mammograms. 12 month follow-up  ultrasound recommended at the time of the next bilateral annual  mammogram.      No evidence of malignancy left breast.      BI-RADS CATEGORY:      BI-RADS Category:  3 Probably Benign.  Recommendation:  Short Interval Follow-up.  Recommended Date:  1 Year.  Laterality:  Right.      For any future breast imaging appointments, please call [phone]).          MACRO:  None      Signed by: Jodi Ansari 12/21/2023 12:08 PM  Dictation workstation:   [id number]           Geri Hwang is a 63 y.o. female for follow up of 2 year history of upper abd pain, getting worse - no relief with shannan (normal GB) - will check labs, order MRCP, mayneed EGD      Connor Segura MD

## 2024-02-23 ENCOUNTER — HOSPITAL ENCOUNTER (OUTPATIENT)
Dept: RADIOLOGY | Facility: HOSPITAL | Age: 64
Discharge: HOME | End: 2024-02-23
Payer: COMMERCIAL

## 2024-02-23 DIAGNOSIS — R10.13 EPIGASTRIC PAIN: ICD-10-CM

## 2024-02-23 PROCEDURE — 2550000001 HC RX 255 CONTRASTS: Performed by: INTERNAL MEDICINE

## 2024-02-23 PROCEDURE — 74183 MRI ABD W/O CNTR FLWD CNTR: CPT | Performed by: RADIOLOGY

## 2024-02-23 PROCEDURE — 76376 3D RENDER W/INTRP POSTPROCES: CPT | Performed by: RADIOLOGY

## 2024-02-23 PROCEDURE — 74183 MRI ABD W/O CNTR FLWD CNTR: CPT

## 2024-02-23 PROCEDURE — A9575 INJ GADOTERATE MEGLUMI 0.1ML: HCPCS | Performed by: INTERNAL MEDICINE

## 2024-02-23 RX ORDER — GADOTERATE MEGLUMINE 376.9 MG/ML
20 INJECTION INTRAVENOUS
Status: COMPLETED | OUTPATIENT
Start: 2024-02-23 | End: 2024-02-23

## 2024-02-23 RX ADMIN — GADOTERATE MEGLUMINE 11 ML: 376.9 INJECTION INTRAVENOUS at 16:48

## 2024-03-05 DIAGNOSIS — Z12.11 COLON CANCER SCREENING: ICD-10-CM

## 2024-03-05 RX ORDER — POLYETHYLENE GLYCOL 3350, SODIUM SULFATE ANHYDROUS, SODIUM BICARBONATE, SODIUM CHLORIDE, POTASSIUM CHLORIDE 236; 22.74; 6.74; 5.86; 2.97 G/4L; G/4L; G/4L; G/4L; G/4L
4000 POWDER, FOR SOLUTION ORAL ONCE
Qty: 4000 ML | Refills: 0 | Status: SHIPPED | OUTPATIENT
Start: 2024-03-05 | End: 2024-03-05

## 2024-03-18 ENCOUNTER — TELEPHONE (OUTPATIENT)
Dept: PRIMARY CARE | Facility: CLINIC | Age: 64
End: 2024-03-18
Payer: COMMERCIAL

## 2024-03-18 NOTE — TELEPHONE ENCOUNTER
left detailed message to call us and schedule her preop clearance for her scheduled eye sx on 5/14/2024.  Paperwork is here in folder at nurse station

## 2024-03-19 DIAGNOSIS — Z12.11 COLON CANCER SCREENING: ICD-10-CM

## 2024-03-19 RX ORDER — POLYETHYLENE GLYCOL 3350, SODIUM SULFATE ANHYDROUS, SODIUM BICARBONATE, SODIUM CHLORIDE, POTASSIUM CHLORIDE 236; 22.74; 6.74; 5.86; 2.97 G/4L; G/4L; G/4L; G/4L; G/4L
4000 POWDER, FOR SOLUTION ORAL ONCE
Qty: 4000 ML | Refills: 0 | Status: SHIPPED | OUTPATIENT
Start: 2024-03-19 | End: 2024-03-19

## 2024-04-16 ENCOUNTER — TELEPHONE (OUTPATIENT)
Dept: GASTROENTEROLOGY | Facility: CLINIC | Age: 64
End: 2024-04-16
Payer: COMMERCIAL

## 2024-04-16 DIAGNOSIS — Z12.11 SCREEN FOR COLON CANCER: Primary | ICD-10-CM

## 2024-04-16 RX ORDER — SOD SULF/POT CHLORIDE/MAG SULF 1.479 G
12 TABLET ORAL DAILY
Qty: 24 TABLET | Refills: 0 | Status: SHIPPED | OUTPATIENT
Start: 2024-04-16 | End: 2024-04-18

## 2024-04-16 NOTE — TELEPHONE ENCOUNTER
----- Message from Meryl Pace MA sent at 4/16/2024 10:42 AM EDT -----  Has a colonoscopy set up she said that you discussed a different prep at her husbands procedure [phone]

## 2024-04-26 ENCOUNTER — OFFICE VISIT (OUTPATIENT)
Dept: GASTROENTEROLOGY | Facility: EXTERNAL LOCATION | Age: 64
End: 2024-04-26
Payer: COMMERCIAL

## 2024-04-26 DIAGNOSIS — K31.7 GASTRIC POLYP: ICD-10-CM

## 2024-04-26 DIAGNOSIS — K29.70 GASTRITIS WITHOUT BLEEDING, UNSPECIFIED CHRONICITY, UNSPECIFIED GASTRITIS TYPE: Primary | ICD-10-CM

## 2024-04-26 DIAGNOSIS — R10.9 STOMACH PAIN: ICD-10-CM

## 2024-04-26 DIAGNOSIS — Z12.11 SCREEN FOR COLON CANCER: ICD-10-CM

## 2024-04-26 DIAGNOSIS — Z86.010 HISTORY OF COLON POLYPS: ICD-10-CM

## 2024-04-26 DIAGNOSIS — D12.2 BENIGN NEOPLASM OF ASCENDING COLON: ICD-10-CM

## 2024-04-26 DIAGNOSIS — D12.4 BENIGN NEOPLASM OF DESCENDING COLON: ICD-10-CM

## 2024-04-26 PROCEDURE — 43239 EGD BIOPSY SINGLE/MULTIPLE: CPT | Performed by: INTERNAL MEDICINE

## 2024-04-26 PROCEDURE — 88305 TISSUE EXAM BY PATHOLOGIST: CPT | Performed by: PATHOLOGY

## 2024-04-26 PROCEDURE — 88305 TISSUE EXAM BY PATHOLOGIST: CPT

## 2024-04-26 PROCEDURE — 45385 COLONOSCOPY W/LESION REMOVAL: CPT | Performed by: INTERNAL MEDICINE

## 2024-04-29 ENCOUNTER — LAB REQUISITION (OUTPATIENT)
Dept: LAB | Facility: HOSPITAL | Age: 64
End: 2024-04-29
Payer: COMMERCIAL

## 2024-04-29 PROBLEM — J11.1 INFLUENZA: Status: RESOLVED | Noted: 2024-04-29 | Resolved: 2024-04-29

## 2024-04-29 PROBLEM — R03.0 FINDING OF ABOVE NORMAL BLOOD PRESSURE: Status: ACTIVE | Noted: 2023-12-07

## 2024-04-29 PROBLEM — N92.6 IRREGULAR MENSTRUAL CYCLE: Status: ACTIVE | Noted: 2024-04-29

## 2024-04-29 PROBLEM — B00.9 HERPES SIMPLEX VIRUS (HSV) INFECTION: Status: ACTIVE | Noted: 2024-04-29

## 2024-04-29 PROBLEM — L72.3 SEBACEOUS CYST: Status: ACTIVE | Noted: 2024-04-29

## 2024-04-29 PROBLEM — N64.3 INCREASED LACTATION: Status: ACTIVE | Noted: 2024-04-29

## 2024-04-29 PROBLEM — M77.10 LATERAL EPICONDYLITIS: Status: ACTIVE | Noted: 2024-04-29

## 2024-05-01 ENCOUNTER — OFFICE VISIT (OUTPATIENT)
Dept: PRIMARY CARE | Facility: CLINIC | Age: 64
End: 2024-05-01
Payer: COMMERCIAL

## 2024-05-01 VITALS
HEART RATE: 79 BPM | BODY MASS INDEX: 22.31 KG/M2 | DIASTOLIC BLOOD PRESSURE: 68 MMHG | OXYGEN SATURATION: 99 % | WEIGHT: 121.25 LBS | TEMPERATURE: 97.9 F | SYSTOLIC BLOOD PRESSURE: 116 MMHG | HEIGHT: 62 IN

## 2024-05-01 DIAGNOSIS — N83.202 CYST OF LEFT OVARY: ICD-10-CM

## 2024-05-01 DIAGNOSIS — M15.9 PRIMARY OSTEOARTHRITIS INVOLVING MULTIPLE JOINTS: ICD-10-CM

## 2024-05-01 DIAGNOSIS — Z01.818 PRE-OPERATIVE CLEARANCE: Primary | ICD-10-CM

## 2024-05-01 PROBLEM — N83.209 BENIGN OVARIAN CYST: Status: RESOLVED | Noted: 2023-11-28 | Resolved: 2024-05-01

## 2024-05-01 PROBLEM — R03.0 FINDING OF ABOVE NORMAL BLOOD PRESSURE: Status: RESOLVED | Noted: 2023-12-07 | Resolved: 2024-05-01

## 2024-05-01 PROBLEM — L82.1 SEBORRHEIC KERATOSES: Status: RESOLVED | Noted: 2023-11-28 | Resolved: 2024-05-01

## 2024-05-01 PROBLEM — F41.1 GENERALIZED ANXIETY DISORDER: Status: RESOLVED | Noted: 2023-11-28 | Resolved: 2024-05-01

## 2024-05-01 PROBLEM — N92.6 IRREGULAR MENSTRUAL CYCLE: Status: RESOLVED | Noted: 2024-04-29 | Resolved: 2024-05-01

## 2024-05-01 PROBLEM — D13.5 ADENOMYOMATOSIS OF GALLBLADDER: Status: RESOLVED | Noted: 2023-11-28 | Resolved: 2024-05-01

## 2024-05-01 PROBLEM — K82.4 POLYP OF GALLBLADDER: Status: RESOLVED | Noted: 2023-11-28 | Resolved: 2024-05-01

## 2024-05-01 PROBLEM — F43.9 STRESS AT HOME: Status: RESOLVED | Noted: 2023-11-28 | Resolved: 2024-05-01

## 2024-05-01 PROBLEM — R42 VERTIGO: Status: RESOLVED | Noted: 2023-11-28 | Resolved: 2024-05-01

## 2024-05-01 PROBLEM — M79.10 MYALGIA: Status: RESOLVED | Noted: 2023-11-28 | Resolved: 2024-05-01

## 2024-05-01 PROBLEM — N64.3 INCREASED LACTATION: Status: RESOLVED | Noted: 2024-04-29 | Resolved: 2024-05-01

## 2024-05-01 PROBLEM — K80.20 CHOLELITHIASIS: Status: RESOLVED | Noted: 2023-11-28 | Resolved: 2024-05-01

## 2024-05-01 PROBLEM — L72.3 SEBACEOUS CYST: Status: RESOLVED | Noted: 2024-04-29 | Resolved: 2024-05-01

## 2024-05-01 PROCEDURE — 99214 OFFICE O/P EST MOD 30 MIN: CPT | Performed by: FAMILY MEDICINE

## 2024-05-01 PROCEDURE — 1036F TOBACCO NON-USER: CPT | Performed by: FAMILY MEDICINE

## 2024-05-01 ASSESSMENT — ENCOUNTER SYMPTOMS
ABDOMINAL PAIN: 1
SHORTNESS OF BREATH: 0

## 2024-05-01 ASSESSMENT — PATIENT HEALTH QUESTIONNAIRE - PHQ9
SUM OF ALL RESPONSES TO PHQ9 QUESTIONS 1 AND 2: 0
2. FEELING DOWN, DEPRESSED OR HOPELESS: NOT AT ALL
1. LITTLE INTEREST OR PLEASURE IN DOING THINGS: NOT AT ALL

## 2024-05-01 NOTE — PATIENT INSTRUCTIONS
The patient has stable medical conditions.  They are not at increased risk for cardiovascular complications.  Medically they are stable to proceed with planned surgery.      There are several things that can cause GI symptoms including loose stools, gas.  That require elimination diets to determine the culprit.      To  determine if you're lactose intolerance is to do a lactose free diet for 2 weeks.  Avoid all dairy products including milk, yogurt, ice cream, butter, etc. for 2 weeks.  If your symptoms improve then most likely you have some lactose intolerance.  This can be treated with over-the-counter Lactaid or lactase milk.  Sometimes this is sufficient to relieve symptoms.  However, some people still have symptoms even with a Lactaid and require a complete lactose free diet.    If you continue to have symptoms a trial of a gluten-free diet is indicated: Gluten is found in wheat, barley, and rye.  These ingredients are found in many foods.  Eating gluten-free can be done by looking in the gluten-free section of the grocery store, avoiding these grains for 1 to 2 weeks and see if it helps her symptoms.  Even people without celiac disease can have some gluten intolerance and noticed GI and symptoms improve off of gluten.    The last culprit that is fairly common is fructose intolerance, this is a little more difficult to avoid as it is quite common in our foods: This is seen in high fructose corn syrup which is a common ingredient of foods, high fructose natural sources include things such as apples, grapes, watermelon, asparagus.  I recommend looking fructose up on the Internet to determine foods to avoid.

## 2024-05-01 NOTE — PROGRESS NOTES
"Subjective   Patient ID: Geri Hwang is a 63 y.o. female who presents for Surgical Clearance; scheduled for bilateral eyelid blepharoplasty on 5/14/24 at the [facility] with Dr. Aydin Kumar.         The patient is here for clearance for surgery.  Getting eye blepharoplasty    The patient does not have a history of any significant cardiovascular disease, no history of significant lung disease.  The patient does not have a history of blood clots or bleeding disorders.  Patient does not have a history of significant reaction to anesthesia.    Recent EGD and colonoscopy was ok    Had polyps   Is still having some upper epigastric pain     Saw GYN in the winter: Does have ovarian cyst but is benign    No Cp or SHORTNESS OF BREATH with exertion   Rare fluttering       Recent blood work in Feb  : normal CBC,  normal CMP          Review of Systems   Respiratory:  Negative for shortness of breath.    Cardiovascular:  Negative for chest pain.   Gastrointestinal:  Positive for abdominal pain.       Objective   /68   Pulse 79   Temp 36.6 °C (97.9 °F)   Ht 1.575 m (5' 2\")   Wt 55 kg (121 lb 4 oz)   LMP  (LMP Unknown)   SpO2 99%   BMI 22.18 kg/m²     Physical Exam  Constitutional:       General: She is not in acute distress.     Appearance: Normal appearance.   HENT:      Head: Normocephalic and atraumatic.      Right Ear: Tympanic membrane, ear canal and external ear normal.      Left Ear: Tympanic membrane, ear canal and external ear normal.      Nose: Nose normal.      Mouth/Throat:      Mouth: Mucous membranes are moist.      Pharynx: No oropharyngeal exudate or posterior oropharyngeal erythema.   Eyes:      Extraocular Movements: Extraocular movements intact.      Conjunctiva/sclera: Conjunctivae normal.      Pupils: Pupils are equal, round, and reactive to light.   Cardiovascular:      Rate and Rhythm: Normal rate and regular rhythm.      Heart sounds: No murmur heard.  Pulmonary:      Effort: " Pulmonary effort is normal.      Breath sounds: Normal breath sounds.   Abdominal:      General: Bowel sounds are normal.      Palpations: Abdomen is soft.   Musculoskeletal:         General: Normal range of motion.      Cervical back: No rigidity.   Lymphadenopathy:      Cervical: No cervical adenopathy.   Skin:     General: Skin is warm and dry.      Findings: No rash.   Neurological:      General: No focal deficit present.      Mental Status: She is alert and oriented to person, place, and time.      Cranial Nerves: No cranial nerve deficit.      Gait: Gait normal.   Psychiatric:         Mood and Affect: Mood normal.         Behavior: Behavior normal.         Assessment/Plan   Problem List Items Addressed This Visit    None  Visit Diagnoses         Codes    Pre-operative clearance    -  Primary Z01.818    Cyst of left ovary     N83.202    Primary osteoarthritis involving multiple joints     M15.9

## 2024-05-02 ENCOUNTER — TELEPHONE (OUTPATIENT)
Dept: PRIMARY CARE | Facility: CLINIC | Age: 64
End: 2024-05-02
Payer: COMMERCIAL

## 2024-05-03 LAB
LABORATORY COMMENT REPORT: NORMAL
PATH REPORT.FINAL DX SPEC: NORMAL
PATH REPORT.GROSS SPEC: NORMAL
PATH REPORT.RELEVANT HX SPEC: NORMAL
PATH REPORT.TOTAL CANCER: NORMAL

## 2024-11-07 ENCOUNTER — HOSPITAL ENCOUNTER (OUTPATIENT)
Dept: RADIOLOGY | Facility: CLINIC | Age: 64
Discharge: HOME | End: 2024-11-07
Payer: COMMERCIAL

## 2024-11-07 ENCOUNTER — OFFICE VISIT (OUTPATIENT)
Dept: PRIMARY CARE | Facility: CLINIC | Age: 64
End: 2024-11-07
Payer: COMMERCIAL

## 2024-11-07 VITALS
HEART RATE: 83 BPM | OXYGEN SATURATION: 97 % | SYSTOLIC BLOOD PRESSURE: 136 MMHG | DIASTOLIC BLOOD PRESSURE: 75 MMHG | WEIGHT: 126 LBS | HEIGHT: 62 IN | TEMPERATURE: 98.8 F | BODY MASS INDEX: 23.19 KG/M2

## 2024-11-07 DIAGNOSIS — M54.6 ACUTE RIGHT-SIDED THORACIC BACK PAIN: Primary | ICD-10-CM

## 2024-11-07 DIAGNOSIS — M54.6 ACUTE RIGHT-SIDED THORACIC BACK PAIN: ICD-10-CM

## 2024-11-07 DIAGNOSIS — R05.1 ACUTE COUGH: ICD-10-CM

## 2024-11-07 LAB
POC BINAX EXPIRATION: NORMAL
POC BINAX NOW COVID SERIAL NUMBER: NORMAL
POC SARS-COV-2 AG BINAX: NORMAL

## 2024-11-07 PROCEDURE — 3008F BODY MASS INDEX DOCD: CPT | Performed by: FAMILY MEDICINE

## 2024-11-07 PROCEDURE — 87811 SARS-COV-2 COVID19 W/OPTIC: CPT | Performed by: FAMILY MEDICINE

## 2024-11-07 PROCEDURE — 99214 OFFICE O/P EST MOD 30 MIN: CPT | Performed by: FAMILY MEDICINE

## 2024-11-07 PROCEDURE — 1036F TOBACCO NON-USER: CPT | Performed by: FAMILY MEDICINE

## 2024-11-07 PROCEDURE — 71101 X-RAY EXAM UNILAT RIBS/CHEST: CPT | Mod: LT

## 2024-11-07 RX ORDER — METHYLPREDNISOLONE 4 MG/1
TABLET ORAL
Qty: 21 TABLET | Refills: 0 | Status: SHIPPED | OUTPATIENT
Start: 2024-11-07 | End: 2024-11-14

## 2024-11-07 ASSESSMENT — PATIENT HEALTH QUESTIONNAIRE - PHQ9
SUM OF ALL RESPONSES TO PHQ9 QUESTIONS 1 AND 2: 0
1. LITTLE INTEREST OR PLEASURE IN DOING THINGS: NOT AT ALL
2. FEELING DOWN, DEPRESSED OR HOPELESS: NOT AT ALL

## 2024-11-07 ASSESSMENT — ENCOUNTER SYMPTOMS
SORE THROAT: 1
COUGH: 1
SHORTNESS OF BREATH: 0

## 2024-11-07 NOTE — PATIENT INSTRUCTIONS
You may take over-the-counter medications as needed for symptom relief.  Call the office if your symptoms worsen such as high fever and worsening cough or increase in symptoms.    When you are back is acutely painful, it is important to avoid strenuous activity and heavy lifting.  However, you still wanted to get some range of motion and not let your back get too stiff.  You can take anti-inflammatory medications like ibuprofen or Aleve.  Tylenol also can be helpful for back pain.  Once your back pain is improving, do some trunk rotations and  back stretches daily.    If you continue to have back pain problems, follow up in the office or give us a call.  Especially if you have persisting symptoms such as numbness and tingling down the legs.    Follow up if symptoms worsen or persist.    Inflammatory, will check chest x-ray to rule out pneumonia

## 2024-11-07 NOTE — PROGRESS NOTES
"Subjective   Patient ID: Geri Hwang is a 64 y.o. female who presents for Back Pain and URI. States her back pain onset after doing some yard work and having a fall at a tennis match. Pain is in her left thoracic region and in her neck. Cold sx's onset this Monday with a scratchy throat.     Back pain   Hit buttocks :  twisted a little   Ws doing yard work   Into right side   Was higher up previously   Last 3 days     Scratchy throat   Nasal congestion   Fatigued due to back pain     Taking ibuprofen   OTC cold meds          Review of Systems   HENT:  Positive for congestion and sore throat.    Respiratory:  Positive for cough. Negative for shortness of breath.        Objective   /75   Pulse 83   Temp 37.1 °C (98.8 °F)   Ht 1.575 m (5' 2\")   Wt 57.2 kg (126 lb)   LMP  (LMP Unknown)   SpO2 97%   BMI 23.05 kg/m²     Physical Exam  Constitutional:       Appearance: Normal appearance.   HENT:      Head: Normocephalic and atraumatic.      Right Ear: Tympanic membrane and ear canal normal.      Left Ear: Tympanic membrane and ear canal normal.      Nose: No nasal deformity.      Right Sinus: No maxillary sinus tenderness or frontal sinus tenderness.      Left Sinus: No maxillary sinus tenderness or frontal sinus tenderness.      Mouth/Throat:      Mouth: Mucous membranes are moist. No oral lesions.      Tongue: No lesions.      Pharynx: Oropharynx is clear.   Cardiovascular:      Rate and Rhythm: Normal rate and regular rhythm.   Pulmonary:      Effort: Pulmonary effort is normal.      Breath sounds: Normal breath sounds.      Comments: No rashes along the chest wall  A little lateral and inferior rib tenderness along the left side  Some mild upper back tenderness  Musculoskeletal:      Cervical back: No tenderness.   Lymphadenopathy:      Cervical: No cervical adenopathy.   Neurological:      General: No focal deficit present.      Mental Status: She is alert and oriented to person, place, and time. "   Psychiatric:         Mood and Affect: Mood normal.         Behavior: Behavior normal.         Assessment/Plan   Problem List Items Addressed This Visit    None  Visit Diagnoses         Codes    Acute right-sided thoracic back pain    -  Primary M54.6    Relevant Medications    methylPREDNISolone (Medrol Dospak) 4 mg tablets    Other Relevant Orders    XR ribs 2 views left w chest pa or ap (Completed)    Acute cough     R05.1    Relevant Medications    methylPREDNISolone (Medrol Dospak) 4 mg tablets    Other Relevant Orders    XR ribs 2 views left w chest pa or ap (Completed)    POCT BinaxNOW Covid-19 Ag Card manually resulted (Completed)

## 2024-11-08 ENCOUNTER — TELEPHONE (OUTPATIENT)
Dept: PRIMARY CARE | Facility: CLINIC | Age: 64
End: 2024-11-08
Payer: COMMERCIAL

## 2024-11-08 NOTE — TELEPHONE ENCOUNTER
----- Message from Frida Correa sent at 11/7/2024  4:14 PM EST -----  Please call patient the x-ray was negative for fractures, lungs clear  Take the steroid as directed

## 2024-12-23 ENCOUNTER — HOSPITAL ENCOUNTER (OUTPATIENT)
Dept: RADIOLOGY | Facility: CLINIC | Age: 64
Discharge: HOME | End: 2024-12-23
Payer: COMMERCIAL

## 2024-12-23 VITALS — HEIGHT: 62 IN | WEIGHT: 126.1 LBS | BODY MASS INDEX: 23.21 KG/M2

## 2024-12-23 DIAGNOSIS — Z12.31 ENCOUNTER FOR SCREENING MAMMOGRAM FOR MALIGNANT NEOPLASM OF BREAST: ICD-10-CM

## 2024-12-23 DIAGNOSIS — R92.8 OTHER ABNORMAL AND INCONCLUSIVE FINDINGS ON DIAGNOSTIC IMAGING OF BREAST: ICD-10-CM

## 2024-12-23 PROCEDURE — 77062 BREAST TOMOSYNTHESIS BI: CPT

## 2024-12-23 PROCEDURE — 77066 DX MAMMO INCL CAD BI: CPT | Performed by: RADIOLOGY

## 2024-12-23 PROCEDURE — 76642 ULTRASOUND BREAST LIMITED: CPT | Performed by: RADIOLOGY

## 2024-12-23 PROCEDURE — 76982 USE 1ST TARGET LESION: CPT | Mod: RT

## 2024-12-23 PROCEDURE — 76642 ULTRASOUND BREAST LIMITED: CPT | Mod: RT

## 2024-12-23 PROCEDURE — 77062 BREAST TOMOSYNTHESIS BI: CPT | Performed by: RADIOLOGY

## 2025-01-02 ENCOUNTER — APPOINTMENT (OUTPATIENT)
Dept: OBSTETRICS AND GYNECOLOGY | Facility: CLINIC | Age: 65
End: 2025-01-02
Payer: COMMERCIAL

## 2025-01-24 ENCOUNTER — APPOINTMENT (OUTPATIENT)
Dept: OBSTETRICS AND GYNECOLOGY | Facility: CLINIC | Age: 65
End: 2025-01-24
Payer: COMMERCIAL

## 2025-02-17 ENCOUNTER — APPOINTMENT (OUTPATIENT)
Dept: OBSTETRICS AND GYNECOLOGY | Facility: CLINIC | Age: 65
End: 2025-02-17
Payer: COMMERCIAL